# Patient Record
Sex: MALE | URBAN - METROPOLITAN AREA
[De-identification: names, ages, dates, MRNs, and addresses within clinical notes are randomized per-mention and may not be internally consistent; named-entity substitution may affect disease eponyms.]

---

## 2024-04-15 ENCOUNTER — TELEPHONE (OUTPATIENT)
Dept: SLEEP CENTER | Age: 39
End: 2024-04-15

## 2024-04-15 NOTE — TELEPHONE ENCOUNTER
Referral to Sleep Medicine scanned into chart. Unable to put referral in d/t unable to locate referring physician d/t [place]

## 2024-04-24 ENCOUNTER — TELEMEDICINE (OUTPATIENT)
Dept: SLEEP CENTER | Age: 39
End: 2024-04-24
Payer: COMMERCIAL

## 2024-04-24 ENCOUNTER — TELEPHONE (OUTPATIENT)
Dept: SLEEP CENTER | Age: 39
End: 2024-04-24

## 2024-04-24 DIAGNOSIS — I10 HYPERTENSION, UNSPECIFIED TYPE: ICD-10-CM

## 2024-04-24 DIAGNOSIS — E66.9 OBESITY, UNSPECIFIED CLASSIFICATION, UNSPECIFIED OBESITY TYPE, UNSPECIFIED WHETHER SERIOUS COMORBIDITY PRESENT: ICD-10-CM

## 2024-04-24 DIAGNOSIS — G47.33 OBSTRUCTIVE SLEEP APNEA: Primary | ICD-10-CM

## 2024-04-24 PROCEDURE — G8427 DOCREV CUR MEDS BY ELIG CLIN: HCPCS | Performed by: NURSE PRACTITIONER

## 2024-04-24 PROCEDURE — 99204 OFFICE O/P NEW MOD 45 MIN: CPT | Performed by: NURSE PRACTITIONER

## 2024-04-24 RX ORDER — LABETALOL 100 MG/1
100 TABLET, FILM COATED ORAL 2 TIMES DAILY
COMMUNITY

## 2024-04-24 RX ORDER — ATORVASTATIN CALCIUM 20 MG/1
20 TABLET, FILM COATED ORAL DAILY
COMMUNITY

## 2024-04-24 RX ORDER — AMLODIPINE BESYLATE 10 MG/1
10 TABLET ORAL DAILY
COMMUNITY

## 2024-04-24 ASSESSMENT — SLEEP AND FATIGUE QUESTIONNAIRES
HOW LIKELY ARE YOU TO NOD OFF OR FALL ASLEEP WHEN YOU ARE A PASSENGER IN A CAR FOR AN HOUR WITHOUT A BREAK: HIGH CHANCE OF DOZING
HOW LIKELY ARE YOU TO NOD OFF OR FALL ASLEEP WHILE SITTING QUIETLY AFTER LUNCH WITHOUT ALCOHOL: SLIGHT CHANCE OF DOZING
HOW LIKELY ARE YOU TO NOD OFF OR FALL ASLEEP WHILE LYING DOWN TO REST IN THE AFTERNOON WHEN CIRCUMSTANCES PERMIT: HIGH CHANCE OF DOZING
ESS TOTAL SCORE: 13
HOW LIKELY ARE YOU TO NOD OFF OR FALL ASLEEP WHILE SITTING AND TALKING TO SOMEONE: WOULD NEVER DOZE
NECK CIRCUMFERENCE (INCHES): 22
HOW LIKELY ARE YOU TO NOD OFF OR FALL ASLEEP IN A CAR, WHILE STOPPED FOR A FEW MINUTES IN TRAFFIC: WOULD NEVER DOZE
HOW LIKELY ARE YOU TO NOD OFF OR FALL ASLEEP WHILE SITTING INACTIVE IN A PUBLIC PLACE: MODERATE CHANCE OF DOZING
HOW LIKELY ARE YOU TO NOD OFF OR FALL ASLEEP WHILE SITTING AND READING: SLIGHT CHANCE OF DOZING
HOW LIKELY ARE YOU TO NOD OFF OR FALL ASLEEP WHILE WATCHING TV: HIGH CHANCE OF DOZING

## 2024-04-24 NOTE — PROGRESS NOTES
[facility]    Jasiel Hernandez (:  1985) is a Established patient, presenting virtually for evaluation of the following:sandrita    Age: 38 y.o.   : 1985    Date of Visit: 24      HPI     Patient presents to establish with sleep medicine for obstructive sleep apnea.  [place] around [place].  He is unsure of location of sleep study or severity of disease.  He was prescribed CPAP following, unknown settings and used until  where it got damaged during a move. He did use it regularly with benefit.    Over the last year, his sleep apnea symptoms have worsened, he snores heavily, has pauses in his breathing, a.m. headaches, fragmented sleep, and unrested sleep.    He was previously working as a , but no longer drives since he was diagnosed with type 2 diabetes.  He does admit to some fatigue occasionally when driving. Denies falling asleep or accidents.    Reported weight of 420 pounds.  Weight has fluctuated up and down over the last 1 to 2 years.    Patient goes to bed around 11 PM, wakes up around 7 AM.  Does not take sleeping aids, sleep latency less than 30 minutes.    Napping about an hour a day.    Sleeping mainly on his stomach and sides, avoid supine sleep.    Does not drink caffeine.  Denies smoking.    Open to sleep study to reevaluate severity as well as restart CPAP therapy.    Sleep Study History: No record of previous sleep study.    Bed time:  11 pm   Wake time:  7 am   Sleep Latency (min):  <30   Sleep Medications: None  Awakenings:   Waking up 4-5 times through the night   / bathroom  / falls back asleep quickly    Daytime Naps: Sometimes through the week-- lasting about an hour  Sleep disturbances: None  Sleep Location: Bed  Sleep environment: Sleeps mainly on his stomach and sides. Doesn't sleep on back because of breathing.    SLEEP HYGIENE     Caffeine:  0   Coffee    0   Soda    0   Tea  Alcohol:

## 2024-05-21 ENCOUNTER — CLINICAL DOCUMENTATION (OUTPATIENT)
Dept: NUTRITION | Age: 39
End: 2024-05-21

## 2024-05-21 NOTE — PROGRESS NOTES
Initial Assessment      Date: 5/17/24  Time: 10:00am  Patient Name: Jasiel Hernandez   Referring Clinician: Adriana Barker NP  Fax: [phone]  Reason for Visit: Uncontrolled Type 2 DM    Goals:  Consume a balanced breakfast within 2 hours of waking at least 4 days per week   Consume 3 balanced meals no more than 4-5 hours between meals per MyPlate method: 1/4 plate complex carbohydrates, 1/4 plate lean protein, 1/2 plate nonstarchy vegetables at least 4 days per week.   Meal Plan & Prep on the weekends every week: create shopping list, go to the market, & cook meals in bulk for the week.    Current Eating Pattern:  Rise at 7am   Breakfast 10am-1pm Fruit (berries, melon), hard boiled egg with turkey sausage  Lunch - grilled chicken, small salad  Snack: berries, fruit  Dinner 7-9pm baked chicken or fish, green vegetables    Pt states that he would like to eat dinner earlier, but dinner is around fiance's schedule.     Bedtime is 11pm-1am.     Fluids: 2 L water per day, 0-sugar drinks: Minutemaid (small amount daily)    Pt states that he loves vegetables.  Pt reports that for the past month, he has not been snacking, omitted all starches - no rice, beans or bread.   No butter or grease, using olive oil.    Pt consumes 3 meals per day + 1 snack. Inconsistent & Inadequate carbohydrate intake.     Allergies/Food Sensitivities:   None    Dietary Limitations/Time/Prep Issues:  Pt's fiance does most of the cooking.     Sleep Patterns:  Obstructive sleep apnea Dx   Pt reports wakes up 1-4 times per night    Stress/Environment Issues that may be affecting po intake:  Pt is concerned about his health.     Work:  Full time student for [employer]    Weight Hx:  Pt states that in the last month, after making changes, he has lost 10 lbs.   Was 431 lb     Family Support:  Pt has a supportive fiance.    Current Exercise Pattern:  Just began a morning exercise routine in the house. Pt reports he has been inconsistent regarding physical

## 2024-05-22 ENCOUNTER — HOSPITAL ENCOUNTER (OUTPATIENT)
Dept: SLEEP CENTER | Age: 39
Discharge: HOME | End: 2024-05-22
Payer: MEDICAID

## 2024-05-22 DIAGNOSIS — G47.33 OBSTRUCTIVE SLEEP APNEA: ICD-10-CM

## 2024-05-22 PROCEDURE — 95811 POLYSOM 6/>YRS CPAP 4/> PARM: CPT

## 2024-05-29 ENCOUNTER — TRANSCRIBE ORDERS (OUTPATIENT)
Dept: ADMINISTRATIVE | Age: 39
End: 2024-05-29

## 2024-05-29 DIAGNOSIS — R10.84 ABDOMINAL PAIN, GENERALIZED: Primary | ICD-10-CM

## 2024-05-29 DIAGNOSIS — R19.7 DIARRHEA, UNSPECIFIED TYPE: ICD-10-CM

## 2024-05-29 DIAGNOSIS — R11.0 NAUSEA: ICD-10-CM

## 2024-06-22 ENCOUNTER — HOSPITAL ENCOUNTER (OUTPATIENT)
Dept: ULTRASOUND IMAGING | Age: 39
Discharge: HOME | End: 2024-06-22
Payer: MEDICAID

## 2024-06-22 DIAGNOSIS — R19.7 DIARRHEA, UNSPECIFIED TYPE: ICD-10-CM

## 2024-06-22 DIAGNOSIS — R11.0 NAUSEA: ICD-10-CM

## 2024-06-22 DIAGNOSIS — R10.84 ABDOMINAL PAIN, GENERALIZED: ICD-10-CM

## 2024-06-22 PROCEDURE — 76705 ECHO EXAM OF ABDOMEN: CPT

## 2024-06-25 ENCOUNTER — TELEMEDICINE (OUTPATIENT)
Dept: SLEEP CENTER | Age: 39
End: 2024-06-25
Payer: MEDICAID

## 2024-06-25 DIAGNOSIS — G47.33 OBSTRUCTIVE SLEEP APNEA: Primary | ICD-10-CM

## 2024-06-25 DIAGNOSIS — E66.9 OBESITY, UNSPECIFIED CLASSIFICATION, UNSPECIFIED OBESITY TYPE, UNSPECIFIED WHETHER SERIOUS COMORBIDITY PRESENT: ICD-10-CM

## 2024-06-25 DIAGNOSIS — I10 HYPERTENSION, UNSPECIFIED TYPE: ICD-10-CM

## 2024-06-25 PROCEDURE — 99214 OFFICE O/P EST MOD 30 MIN: CPT | Performed by: NURSE PRACTITIONER

## 2024-06-25 NOTE — PROGRESS NOTES
tolerant of high CPAP pressure.  -Discussed pathophysiology of NORBERT and its impact on daily well-being, as well as cardiometabolic and neurocognitive health (particularly in moderate-severe cases).  -Discussed CPAP as first-line and gold-standard therapy for NORBERT.   -Reviewed new PAP therapy instructions to be compliant with most insurance coverage:  -Use PAP device for at least 4 hours nightly.  -PAP therapy must be used for 70% of nights (5/7 nights per week)  -Patient must follow up in the clinic within 30-90 days from getting the PAP device.   -Will expedite orders given severity.       2.  Hypertension    -Start auto CPAP    3. Obesity (There is no height or weight on file to calculate BMI.)     -Discussed impact of weight gain on NORBERT severity. Patient understands that NORBERT severity may improve with weight loss but no guarantee of cure can be made.   -Continue healthy weight loss.    Return in about 3 months (around 9/25/2024) for CPAP compliance.    Jaisel Hernandez, was evaluated through a synchronous (real-time) audio-video encounter. The patient (or guardian if applicable) is aware that this is a billable service, which includes applicable co-pays. This Virtual Visit was conducted with patient's (and/or legal guardian's) consent. Patient identification was verified, and a caregiver was present when appropriate.   The patient was located at Home: [facility] [address]  [place] [address]  Provider was located at Home (Appt Dept State): [place]    Are you appropriately licensed in the patient's state?: Yes      --ZULEYMA Coppola - KARY   Sleep Medicine   [facility]  P -[phone]  F- [phone]

## 2024-07-12 ENCOUNTER — CLINICAL DOCUMENTATION (OUTPATIENT)
Dept: NUTRITION | Age: 39
End: 2024-07-12

## 2024-07-12 NOTE — PROGRESS NOTES
Nutrition Follow Up Note    Date: 7/12/2024  Patient: Jasiel Hernandez  Referring Clinician: Adriana Barker NP  Fax: [phone]  Patient's Last Session: 5/17/24  Reason for Visit: Uncontrolled Type 2 DM    Progress with SMART goals:   Consume balanced breakfast within 2 hours of waking at least 4 days per week. - 75%  Consume 3 balanced meals per day, no more than 4-5 hours between meals. - 75%  Meal Plan & Prep on the weekends every week. - 100%    Current eating pattern:   Rise at 7am, Breakfast by 9am: oatmeal, fruit, grilled chicken, sometimes salad at breakfast  Lunch - salad, lean meat: fish, chicken, whole grain bread, sweet potato, whole wheat toast  Dinner 6-7pm -  salad, lean meat: fish, chicken, whole grain bread, sweet potato, whole wheat toast  Bedtime between 11pm-12am.     Current exercise routine:   Walking during travel: 3-4 miles per day    Last visit weight: 421 lb per pt  CBW: 415 lb per   Height: 5'8\"    Notes:   Pt reports taking positive strides with balancing blood sugar with diet. Reports consumes breakfast within 2 hours of waking, and consumes 3 balanced meals 4 days per week. Reports adhering to portion size per MyPlate, and consistent carbohydrate intake in order to stabilize blood sugar. Reports has been avoiding snacking before bedtime. Pt reports making dietary changes has not been difficult for him.     Pt reports continues to avoid saturated fat, and added sugars, Remains mindful regarding low sodium diet.     Pt reports compliance with CPAP and having more restful sleep.     Pt reports BM average 2 x/day, 25% of the time loose stool, 75% of the time regular BM.     Pt reports 6 lb weight loss in the last 2 months. Reports when he checks his blood sugar 2 hours after a meal, it is between 100-120, and that his A1c went from 14% to 11%.     Pt states that he feels more energized throughout the day, and is highly motivated to continue to take steps to lose weight and normalize labs. Expect

## 2024-09-03 ENCOUNTER — HOSPITAL ENCOUNTER (EMERGENCY)
Age: 39
Discharge: HOME | End: 2024-09-03
Payer: MEDICAID

## 2024-09-03 ENCOUNTER — APPOINTMENT (OUTPATIENT)
Dept: GENERAL RADIOLOGY | Age: 39
End: 2024-09-03
Payer: MEDICAID

## 2024-09-03 VITALS
RESPIRATION RATE: 24 BRPM | HEART RATE: 94 BPM | OXYGEN SATURATION: 100 % | SYSTOLIC BLOOD PRESSURE: 156 MMHG | TEMPERATURE: 98.1 F | DIASTOLIC BLOOD PRESSURE: 80 MMHG | WEIGHT: 315 LBS

## 2024-09-03 DIAGNOSIS — S93.401A SPRAIN OF RIGHT ANKLE, UNSPECIFIED LIGAMENT, INITIAL ENCOUNTER: Primary | ICD-10-CM

## 2024-09-03 PROCEDURE — 73610 X-RAY EXAM OF ANKLE: CPT

## 2024-09-03 PROCEDURE — 99283 EMERGENCY DEPT VISIT LOW MDM: CPT

## 2024-09-03 PROCEDURE — 6370000000 HC RX 637 (ALT 250 FOR IP): Performed by: PHYSICIAN ASSISTANT

## 2024-09-03 RX ORDER — OXYCODONE AND ACETAMINOPHEN 5; 325 MG/1; MG/1
1 TABLET ORAL ONCE
Status: COMPLETED | OUTPATIENT
Start: 2024-09-03 | End: 2024-09-03

## 2024-09-03 RX ORDER — ACETAMINOPHEN 500 MG
1000 TABLET ORAL ONCE
Status: COMPLETED | OUTPATIENT
Start: 2024-09-03 | End: 2024-09-03

## 2024-09-03 RX ADMIN — OXYCODONE AND ACETAMINOPHEN 1 TABLET: 5; 325 TABLET ORAL at 22:34

## 2024-09-03 RX ADMIN — ACETAMINOPHEN 1000 MG: 500 TABLET ORAL at 20:15

## 2024-09-03 ASSESSMENT — PAIN DESCRIPTION - ORIENTATION
ORIENTATION: RIGHT

## 2024-09-03 ASSESSMENT — LIFESTYLE VARIABLES: HOW OFTEN DO YOU HAVE A DRINK CONTAINING ALCOHOL: NEVER

## 2024-09-03 ASSESSMENT — PAIN DESCRIPTION - DESCRIPTORS
DESCRIPTORS: ACHING;CRAMPING;CRUSHING
DESCRIPTORS: ACHING;DISCOMFORT;CRUSHING

## 2024-09-03 ASSESSMENT — PAIN - FUNCTIONAL ASSESSMENT: PAIN_FUNCTIONAL_ASSESSMENT: 0-10

## 2024-09-03 ASSESSMENT — PAIN DESCRIPTION - LOCATION
LOCATION: ANKLE
LOCATION: ANKLE
LOCATION: LEG

## 2024-09-03 ASSESSMENT — PAIN SCALES - GENERAL: PAINLEVEL_OUTOF10: 10

## 2024-09-04 NOTE — ED PROVIDER NOTES
Independent DESTINY Visit.    HPI:  9/3/24,   Time: 8:13 PM EDT         Jasiel Hernandez is a 39 y.o. male presenting to the ED for ankle pain.  Just prior to arrival patient was playing with his son when he jumped off the swing and his right ankle twisted.  He heard a \"pop\".  He still ambulatory but states it makes the pain a lot worse.  Pain over the anterior aspect of the right ankle.  No edema, ecchymosis or deformity noted.  He did not take any medications for his pain.  No numbness or tingling.    ROS:   Pertinent positives and negatives are stated within HPI, all other systems reviewed and are negative.  --------------------------------------------- PAST HISTORY ---------------------------------------------  Past Medical History:  has no past medical history on file.    Past Surgical History:  has no past surgical history on file.    Social History:  reports that he has never smoked. He has never used smokeless tobacco. He reports that he does not currently use alcohol. He reports that he does not currently use drugs.    Family History: family history is not on file.     The patient’s home medications have been reviewed.    Allergies: Pcn [penicillins] and Ibuprofen    -------------------------------------------------- RESULTS -------------------------------------------------  All laboratory and radiology results have been personally reviewed by myself   LABS:  No results found for this visit on 09/03/24.    RADIOLOGY:  Interpreted by Radiologist.  XR ANKLE RIGHT (MIN 3 VIEWS)   Final Result   No acute abnormality of the ankle.             ------------------------- NURSING NOTES AND VITALS REVIEWED ---------------------------   The nursing notes within the ED encounter and vital signs as below have been reviewed.   BP (!) 201/103   Pulse (!) 102   Temp 98.1 °F (36.7 °C)   Resp 24   Wt (!) 186 kg (410 lb)   SpO2 100%   Oxygen Saturation Interpretation:

## 2024-09-16 ENCOUNTER — OFFICE VISIT (OUTPATIENT)
Dept: SLEEP CENTER | Age: 39
End: 2024-09-16
Payer: MEDICAID

## 2024-09-16 VITALS
HEART RATE: 83 BPM | DIASTOLIC BLOOD PRESSURE: 98 MMHG | BODY MASS INDEX: 46.65 KG/M2 | RESPIRATION RATE: 20 BRPM | OXYGEN SATURATION: 93 % | SYSTOLIC BLOOD PRESSURE: 163 MMHG | WEIGHT: 315 LBS | HEIGHT: 69 IN

## 2024-09-16 DIAGNOSIS — G47.33 OBSTRUCTIVE SLEEP APNEA: Primary | ICD-10-CM

## 2024-09-16 DIAGNOSIS — I10 HYPERTENSION, UNSPECIFIED TYPE: ICD-10-CM

## 2024-09-16 DIAGNOSIS — E66.9 OBESITY, UNSPECIFIED CLASSIFICATION, UNSPECIFIED OBESITY TYPE, UNSPECIFIED WHETHER SERIOUS COMORBIDITY PRESENT: ICD-10-CM

## 2024-09-16 PROCEDURE — 99214 OFFICE O/P EST MOD 30 MIN: CPT | Performed by: NURSE PRACTITIONER

## 2024-09-16 PROCEDURE — 3077F SYST BP >= 140 MM HG: CPT | Performed by: NURSE PRACTITIONER

## 2024-09-16 PROCEDURE — 3080F DIAST BP >= 90 MM HG: CPT | Performed by: NURSE PRACTITIONER

## 2024-09-16 ASSESSMENT — SLEEP AND FATIGUE QUESTIONNAIRES
HOW LIKELY ARE YOU TO NOD OFF OR FALL ASLEEP WHILE SITTING AND TALKING TO SOMEONE: WOULD NEVER DOZE
ESS TOTAL SCORE: 2
HOW LIKELY ARE YOU TO NOD OFF OR FALL ASLEEP WHILE SITTING QUIETLY AFTER LUNCH WITHOUT ALCOHOL: SLIGHT CHANCE OF DOZING
HOW LIKELY ARE YOU TO NOD OFF OR FALL ASLEEP WHILE SITTING AND READING: WOULD NEVER DOZE
HOW LIKELY ARE YOU TO NOD OFF OR FALL ASLEEP IN A CAR, WHILE STOPPED FOR A FEW MINUTES IN TRAFFIC: WOULD NEVER DOZE
HOW LIKELY ARE YOU TO NOD OFF OR FALL ASLEEP WHILE LYING DOWN TO REST IN THE AFTERNOON WHEN CIRCUMSTANCES PERMIT: WOULD NEVER DOZE
HOW LIKELY ARE YOU TO NOD OFF OR FALL ASLEEP WHILE WATCHING TV: SLIGHT CHANCE OF DOZING
HOW LIKELY ARE YOU TO NOD OFF OR FALL ASLEEP WHEN YOU ARE A PASSENGER IN A CAR FOR AN HOUR WITHOUT A BREAK: WOULD NEVER DOZE
HOW LIKELY ARE YOU TO NOD OFF OR FALL ASLEEP WHILE SITTING INACTIVE IN A PUBLIC PLACE: WOULD NEVER DOZE

## 2024-11-23 ENCOUNTER — HOSPITAL ENCOUNTER (EMERGENCY)
Age: 39
Discharge: HOME | End: 2024-11-24
Attending: EMERGENCY MEDICINE
Payer: MEDICAID

## 2024-11-23 DIAGNOSIS — S43.401A SPRAIN OF RIGHT SHOULDER, UNSPECIFIED SHOULDER SPRAIN TYPE, INITIAL ENCOUNTER: Primary | ICD-10-CM

## 2024-11-23 DIAGNOSIS — J02.9 ACUTE PHARYNGITIS, UNSPECIFIED ETIOLOGY: ICD-10-CM

## 2024-11-23 PROCEDURE — 99284 EMERGENCY DEPT VISIT MOD MDM: CPT

## 2024-11-23 ASSESSMENT — PAIN - FUNCTIONAL ASSESSMENT: PAIN_FUNCTIONAL_ASSESSMENT: 0-10

## 2024-11-23 ASSESSMENT — PAIN SCALES - GENERAL: PAINLEVEL_OUTOF10: 9

## 2024-11-24 ENCOUNTER — APPOINTMENT (OUTPATIENT)
Dept: GENERAL RADIOLOGY | Age: 39
End: 2024-11-24
Payer: MEDICAID

## 2024-11-24 VITALS
SYSTOLIC BLOOD PRESSURE: 128 MMHG | RESPIRATION RATE: 24 BRPM | DIASTOLIC BLOOD PRESSURE: 78 MMHG | TEMPERATURE: 98.2 F | BODY MASS INDEX: 59.07 KG/M2 | OXYGEN SATURATION: 96 % | HEART RATE: 98 BPM | WEIGHT: 315 LBS

## 2024-11-24 LAB
FLUAV RNA RESP QL NAA+PROBE: NOT DETECTED
FLUBV RNA RESP QL NAA+PROBE: NOT DETECTED
SARS-COV-2 RNA RESP QL NAA+PROBE: NOT DETECTED
SOURCE: NORMAL
SPECIMEN DESCRIPTION: NORMAL
SPECIMEN SOURCE: NORMAL
STREP A, MOLECULAR: NEGATIVE

## 2024-11-24 PROCEDURE — 87636 SARSCOV2 & INF A&B AMP PRB: CPT

## 2024-11-24 PROCEDURE — 87651 STREP A DNA AMP PROBE: CPT

## 2024-11-24 PROCEDURE — 73000 X-RAY EXAM OF COLLAR BONE: CPT

## 2024-11-24 PROCEDURE — 73030 X-RAY EXAM OF SHOULDER: CPT

## 2024-11-24 PROCEDURE — 6370000000 HC RX 637 (ALT 250 FOR IP): Performed by: EMERGENCY MEDICINE

## 2024-11-24 RX ORDER — HYDROCODONE BITARTRATE AND ACETAMINOPHEN 5; 325 MG/1; MG/1
1 TABLET ORAL ONCE
Status: COMPLETED | OUTPATIENT
Start: 2024-11-24 | End: 2024-11-24

## 2024-11-24 RX ORDER — LIDOCAINE HYDROCHLORIDE 20 MG/ML
15 SOLUTION OROPHARYNGEAL ONCE
Status: COMPLETED | OUTPATIENT
Start: 2024-11-24 | End: 2024-11-24

## 2024-11-24 RX ADMIN — HYDROCODONE BITARTRATE AND ACETAMINOPHEN 1 TABLET: 5; 325 TABLET ORAL at 00:12

## 2024-11-24 RX ADMIN — LIDOCAINE HYDROCHLORIDE 15 ML: 20 SOLUTION ORAL at 01:48

## 2024-11-24 ASSESSMENT — PAIN DESCRIPTION - LOCATION
LOCATION: NECK;SHOULDER
LOCATION: THROAT

## 2024-11-24 ASSESSMENT — PAIN SCALES - GENERAL
PAINLEVEL_OUTOF10: 10
PAINLEVEL_OUTOF10: 5

## 2024-11-24 ASSESSMENT — PAIN DESCRIPTION - DESCRIPTORS
DESCRIPTORS: SORE;ITCHING
DESCRIPTORS: THROBBING;ACHING;SHARP

## 2024-11-24 ASSESSMENT — PAIN DESCRIPTION - ORIENTATION: ORIENTATION: RIGHT

## 2024-11-24 NOTE — ED PROVIDER NOTES
Patient is a 40 y/o male who presents to the ED with right shoulder pain. Patient states that he fell on his porch injuring his right shoulder today. He states that the injury occurred approximately seven hours prior to arrival. He denies hitting his head or any loss of consciousness. Since the fall he has had 9/10 pain in his right shoulder and cannot lift his right arm. He also reports having a sore throat. He denies any fever. He states that his son is sick secondary to rhinovirus. He denies any cough, nausea, vomiting or diarrhea.         Review of Systems   Constitutional:  Negative for chills and fever.   HENT:  Positive for sore throat. Negative for ear pain and sinus pressure.    Eyes:  Negative for pain, discharge and redness.   Respiratory:  Negative for cough, shortness of breath and wheezing.    Cardiovascular:  Negative for chest pain.   Gastrointestinal:  Negative for abdominal pain, diarrhea, nausea and vomiting.   Genitourinary:  Negative for dysuria and frequency.   Musculoskeletal:  Positive for arthralgias. Negative for back pain.   Skin:  Negative for rash and wound.   Neurological:  Negative for weakness and headaches.   Hematological:  Negative for adenopathy.   All other systems reviewed and are negative.       Physical Exam  Vitals and nursing note reviewed.   Constitutional:       General: He is not in acute distress.     Appearance: He is obese.   HENT:      Head: Normocephalic and atraumatic.      Right Ear: External ear normal.      Left Ear: External ear normal.      Nose: Nose normal.      Mouth/Throat:      Mouth: Mucous membranes are moist.   Eyes:      Conjunctiva/sclera: Conjunctivae normal.      Pupils: Pupils are equal, round, and reactive to light.   Neck:      Comments: No midline cervical spine tenderness to palpation.  Cardiovascular:      Rate and Rhythm: Regular rhythm. Tachycardia present.      Heart sounds: No murmur heard.  Pulmonary:      Effort: Pulmonary effort is